# Patient Record
(demographics unavailable — no encounter records)

---

## 2025-06-19 NOTE — PROCEDURE
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 After decongestant and anesthesia were applied the laryngoscope was passed. The nasal cavities, nasopharynx, oropharynx, hypopharynx, and larynx were all examined. Vocal folds were examined during respiration and phonation. The following findings were noted:   Findings: Nose: Septum is midline, turbinates are normal, nasal airways patent, mucosa normal Nasopharynx: Adenoids normal, no masses, eustachian tube normal Oropharynx: Pharyngeal walls symmetric and without lesion. Tonsils/fossae symmetric Hypopharynx: Hypopharynx and pyriform sinuses without lesion. No masses or asymmetry. + pooling of secretions. Larynx: Epiglottis and aryepiglottic folds were sharp and crisp bilaterally. Bilateral false vocal folds normal appearance. Airway was widely patent. + postcricoid edema, erythema of the vocal process   Strobe Exam Ratings   TVF Appearance: normal, mild erythema of the vocal process, left TVF ectasia  TVF Mobility: normal Edema/hypertrophy: normal, + postcricoid edema Mucus on TVF: normal Glottic Closure: normal/adequate Mucosal Wave: normal Amplitude of Vibration: normal Phase: symmetric Supraglottic Hyperfunction: none Other Findings: none   Condition: Stable. Patient tolerated procedure well.   Complications: None  ---------------------    Laryngoscopy: PREOPERATIVE DIAGNOSIS: neurogenic cough POSTOPERATIVE DIAGNOSIS: Same as above NAME OF PROCEDURE: Superior Laryngeal Nerve Injection - 41477 - Right   SURGEON: Bruce Delcid MD   ASSISTANTS: none ANESTHESIA: none ESTIMATED BLOOD LOSS: < 1 cc SPECIMENS: None COMPLICATIONS: None   INDICATIONS FOR SURGERY: This is a patient with throat discomfort and chronic cough with presumed neurogenic cough. The decision was made to proceed with a superior laryngeal nerve injection. The patient understands the risks, benefits, and alternatives of the surgery including possibility of worsened voice, pain, discomfort, bleeding, need for further surgery, difficulty swallowing, and wished to go ahead with the operation as planned. I spoke with the patient and all of their questions were answered to their satisfaction and they asked me to perform this procedure.   DETAILS OF THE PROCEDURE: The patient was brought to the procedure room and I identified them. A formal timeout was called.   The neck was wiped clean with an alcohol wipe.  Next I palpated landmarks including the hyoid, and thryoid cartilage.  Once the thyrohyoid membrane was identified I palpated the carotid on the RIGHT side and protected this with my finger.  Next using a 25 gauge needle I injected 2cc (50:50 Kenalog 40 and Bupivicaine 0.5% 1cm anterior and 1cm deep to the carotid and into the thyroid membrane. The injector was then withdrawn.  No bleeding was identified.  The patient was observed for 15 minutes without complication.   Complications: none Disposition: home

## 2025-06-19 NOTE — ASSESSMENT
[FreeTextEntry1] : - 4/16/24: 70 y/o F presents with cough since 2016. She reports it is productive and she expectorates clear phlegm, it is every day, throughout the day. No issues eating, or chewing, but she has had choking episodes on solids/ liquids over the past 2 months. She feels her voice is raspy, worse over the past 6 months. Based on history and physical exam, I do believe there is a component of laryngopharyngeal reflux. At this time I am recommending dietary and behavioral change to reduce acid in the diet. I am also recommending she continue Pantoprazole and Famotidine for a 3 months trial and cough avoidance. If cough persists consider treatment for neurogenic cough.   Of note she has chronic rhinorrhea and postnasal drip. On physical exam/ nasal endoscopy she was found to have left septal deviation. I am recommending nasal saline rinses, nasal steroids, add Azelastine.  8/12/24 70% subjective improvement in terms of chronic cough. On physical exam/stroboscopy, improvement in yellow discoloration on vocal folds likely 2/2 cough related trauma. I am recommending she begin Ipratropium nasal spray for 3 week trial and during that time she stop Azelastine and otherwise continue rinses, nasal steroids, and reflux management. If no benefit with ipratropium, switch back to azelastine. Follow up in 2-3 months. If stable with cough improvement can consider reintroducing certain foods such as blueberries.  4/11/25: She has been following a low acid diet and on antireflux medications, using nasal saline rinses and nasal antihistamines and feels cough worsened over the past 5 months. Overall cough is 60% improved compared to symptom onset. On physical exam she was found to have evidence of LPR. She has been treated for LPR, asthma has been ruled out. I suspect she may be suffering from neurogenic cough. We discussed treatment with neuromodulators and superior laryngeal nerve injections. She has stress test later today. I recommended trial of Tramadol and to follow up in 2 weeks for superior laryngeal nerve injection. I also recommended consultation with SLP for cough suppression. Continue current reflux regimen.   5/7/25 No change in symptoms.  Tramadol was not effective.  Did left superior laryngeal nerve injection without issue.  Follow up in 2 weeks for repeat injection, next on the right side.  5/22/25: 2 weeks s/p left superior laryngeal nerve injection.  Notes an 85% improvement in terms of symptoms.  We had a lengthy discussion regarding proceeding with another injection versus observing for now.  Patient wanted to proceed with the right side and will follow-up in 2 weeks to review.  If stable or improved, we will likely recommend observation instead of proceeding with more injections.  6/5/25: Patient is 2 weeks s/p right superior laryngeal nerve injection (2/4). She notes 60% improvement in cough since initial onset of symptoms. She feels she took a step back as soon as she got to [place], [place] and feels humidity/ air quality worsened cough, came back 4 days ago and cough is slightly improved since being back.  Today we did a left-sided superior laryngeal nerve injection (3/4). Patient tolerated this well. They will follow-up in approximately 2 weeks for repeat evaluation.  6/19/25: Patient is 2 weeks s/p left superior laryngeal nerve injection today (3/4). She notes cough worsened approximately 4 days ago, but she still notes overall a 60% improvement since initial onset of symptoms. 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 Today we did a right-sided superior laryngeal nerve injection (4/4). Patient tolerated this well. I am recommending restarting anti reflux medications, Omeprazole and Famotidine. They will follow-up in approximately 2 weeks for repeat evaluation.  -right superior laryngeal nerve injection today (4/4) -Continue nasal saline rinses and nasal steroids and Azelastine  -Voice hygiene, increase hydration, sips of water throughout the day, avoid throat clearing, cough avoidance  -Consultation with SLP for cough suppression -low acid diet -Omeprazole and Famotidine  -Follow up in 2 weeks

## 2025-06-19 NOTE — HISTORY OF PRESENT ILLNESS
[de-identified] : 4/16/24: 70 y/o F presents with constant cough since 2016. She reports it is productive and she expectorates clear phlegm, it is every day, throughout the day. No issues eating, or chewing, but she has had choking episodes on solids/ liquids over the past 2 months. No issues breathing. She has seen Dr. Mane and tested negative for asthma. No regurgitation of food. She feels her voice is raspy, worse over the past 6 months. Works as a . No significant voice demands. She has HTN, not on any ACE inhibitors. Of note she is S/p excision of left submandibular gland for pleomorphic adenoma in July 2016 with Dr. Avila. She has history of reflux. She is on Pantoprazole and following a low acid diet, previously on Famotidine for at least one year with no improvement.  Diet: +tea, mint, garlic, onion, blueberry, carbonated beverages -coffee, soda, chocolate, tomato, citrus, garlic, spicy food water intake: 10 glasses of [employer] late night eating: no  Of note she has chronic rhinorrhea and postnasal drip. She denies facial pain or changes in smell or taste. She started using Flonase which she finds helpful, no rinses. Saw allergist 2 years ago. No pertinent positives per pt.  - 8/12/24 Patient has been following a low acid diet, added famotidine qPM to pantoprazole qAM, and daily saline sinus rinses, nasal steroids, azelastine and notes 70% improvement in terms of her cough. Voice improved with coughing less. She continues to cough in the mornings when she wakes up and in the afternoon at the office otherwise her cough is not present nearly as severely or frequently as it was before. She believes that it is related to her nose, triggered by the carpet in her office. Nasal drainage worse with eating cold foods. No issues eating, chewing, or swallowing. No breathing issues. No new ENT issues otherwise. - 4/11/25: Patient presents for repeat evaluation. She has been following a low acid diet and on antireflux medications, using nasal saline rinses and nasal antihistamines and feels cough worsened over the past 5 months. Overall cough is 60% improved compared to symptom onset. Last CXR was in 4/24 which was normal. She endorses multiple coughing fits throughout the day, longest she can go is 1 hour. She feels cough is triggered by certain scents such as aerosol sprays and perfumes. No issues eating, chewing, or swallowing. She feels her voice has been stable.  - 5/7/25 Continued pantoprazole/famotidine and initiated tramadol trial without improvement. Cough suppression therapy not yet scheduled.  Still with cough.  No new symptoms.  No new ENT issues otherwise. - 5/22/25: 2 weeks s/p left superior laryngeal nerve injection.  Notes an 85% improvement in terms of cough related symptoms.  She is very happy with these results.  Did not like taking the tramadol.  No new ENT issues otherwise. - 6/5/25: Patient is 2 weeks s/p right superior laryngeal nerve injection (2/4). She tolerated this well without ariza eeffects. She notes 60% improvement in cough since initial onset of symptoms. She feels she took a step back as soon as she got to [place], [place] and feels humidity/ air quality worsened cough, came back 4 days ago and cough is slightly improved since being back. Patient not currently on antireflux medications. No new ENT issues otherwise.  - [FreeTextEntry1] : 6/19/25: Patient is 2 weeks s/p left superior laryngeal nerve injection today (3/4). She tolerated this well without side effects. She notes cough worsened approximately 4 days ago, but she still notes overall a 60% improvement in cough since initial onset of symptoms.  No new ENT issues otherwise.

## 2025-06-30 NOTE — HISTORY OF PRESENT ILLNESS
[FreeTextEntry1] : 69 y/o F with PMH HTN and DM2 presenting for dizziness, nausea, and vomiting  since yesterday. She reports that yesterday at 6PM she had sudden onset pain  behind her right eye that lasted for minutes and then resolved. She then  started to have blurry vision in the right eye. Later in the evening she  started to experience vertigo like symptoms. When standing from a seated  position she started feeling as if the room was spinning followed by nausea and  vomiting. Motion in the taxi also caused the symptoms to occur. She woke up  this morning and continued to have the symptoms and believed that they were  worse. Denies chest pain, SOB, abdominal pain. No symptoms like this in the  past. Denies trauma. Recently HTN and diabetes medication doses were reduced in  office with Dr. Milner.   In the ED, stroke code was called - CTH was negative for any acute pathology -  however, given symptoms that did not fully convey a picture of simple vertigo,  patient to be admitted to the [facility] for a stroke workup. Her MRI was  negative for any acute pathology and an ECHO illustrated normal ejection  fraction and no valve disorders.   On tele, she had been bradycardic (40s on day of admission, 50s the next day)  with low BP - her beta blocker and blood pressure medications were held. There  is a chance that her blood pressure medication doses are too high and are  leading to symptoms.  Here for f/u. 4/29/19 Reports losing her job, under stress. 9/25/20 Hand lesion recurred on left, S/E from Metformin 11/24/20  Seeing Dr Aleman, Had CT and MRI of liver.  Cataract OD Dec 2, 2020 11/5/21 Getting HAs, saw Neuro for back and neck pain mos ago. HAs started 1 week ago.  Located across both brows.  + SCIATICA  D/T TRAIN RIDES TO [place] 8/12/22 In the [facility] ER yesterday with  systolic. 12 days earlier Dr Lai stopped Norvasc and was she given Losartan 50 mg. Imaging done because of HAs, possible carotid webs. 4/23/26 chronic cough, has seen Pulm and Allergy, it has been since the neck surgery.  Allergist thinks it is D/T nerve damage.  Bothered by diarrhea from Metformin, wants to stop it 6/30/25 here for follow up of blood pressures. taking it daily -ranging from bp 140s-160s. has noticed her pressure has been trending up. having severe headaches. lying down makes it better. feels shooting brain freeze like feeling in the back of her head. 10/10 pain. prior to her headaches, her bp was from 130s-140s daily. had leg cramps on triamterene-hctz.

## 2025-06-30 NOTE — REASON FOR VISIT
[Follow-Up - Clinic] : a clinic follow-up of [Hypertension] : hypertension [Medication Management] : Medication management [FreeTextEntry1] : HTN and DM ARE THE 2 MAIN ISSUES.   Losing weight with diet and exercise.\par  \par  colonoscopy 2/2018, 2019, 2020\par  Mammogram- 5/2018\par  \par  EKG: NSR with ST-[facility] abnormalities.9/26/18, 9/25/20\par  \par  Puljaylene- Dr Mane\par  GI- Dr Cavazos\par  Ophtho- Dr Shepherd\par  Hand

## 2025-06-30 NOTE — ASSESSMENT
[FreeTextEntry1] : Carotid Webs- seen B/L on CTA in , will refer to Dr Merritt for confirmation. 6/30/25 no evidence of fmd on renal u/s  HTN- Labile after stopping Norvasc 10 mg. Back on it with Dyazide.  Pt instructed on what to do with diuretic, must take home readings. Adding Bystolic 2.5 mg on 4/26/23. 6/30/25 consistently elevated. will add candesartan as it can help with HAs. rto in 2-3 weeks for bp recheck and labs   Hand/wrist lesion- referred to [facility] Hand service.  Recurrence of lesion  [id number] 5.9 2015, 5.8 2016.  Did see Ophtho, continues to lose weight voluntarily.  Changing Metformin to LA form to see if GI S/E are better . She wants off of it D/T diarrhea. Will refer to diabetes center.  ABNORMAL EKG- chronic and stable Tw abn 6/30/25 will get echo d/t uncontrolled htn and ekg abnormalities   HA- Massage, humidify bedroom, nasal spray.   Imaging negative in ER except for carotid webs. ESR drawn. Labs were drawn today in Office. 6/30/25 will get xray of cervical spine. encouraged hot packs and tylenol prn.   HLD- LDL 96 IN 2016,  labs excellent May 2018, and order a CCS since she is contemplating left foot surgery the end of October.  CCS = zero.   Starting statin for LDL > 70 in a prediabetic. 6/30/25 off of statin -will recheck lipids at follow up.   Anxiety- Alprazolam 0.5mg given, # 30, [id number], [id number]  Elevated Alk Phos- Seeing Dr Aleman.    frequent urination - 6/30/25 will get UA. A1C at follow up   Screening- Had colonoscopy with Dr HANSEN, 2/2019.  needs repeat colonoscopy due to mult polyps.  Done Sept 2020. Mammogram yearly, GYN Dr Farmer.

## 2025-06-30 NOTE — REVIEW OF SYSTEMS
[Headache] : headache [Negative] : Heme/Lymph Low Dose Naltrexone Pregnancy And Lactation Text: Naltrexone is pregnancy category C.  There have been no adequate and well-controlled studies in pregnant women.  It should be used in pregnancy only if the potential benefit justifies the potential risk to the fetus.   Limited data indicates that naltrexone is minimally excreted into breastmilk.

## 2025-07-16 NOTE — ASSESSMENT
[FreeTextEntry1] : - 4/16/24: 68 y/o F presents with cough since 2016. She reports it is productive and she expectorates clear phlegm, it is every day, throughout the day. No issues eating, or chewing, but she has had choking episodes on solids/ liquids over the past 2 months. She feels her voice is raspy, worse over the past 6 months. Based on history and physical exam, I do believe there is a component of laryngopharyngeal reflux. At this time I am recommending dietary and behavioral change to reduce acid in the diet. I am also recommending she continue Pantoprazole and Famotidine for a 3 months trial and cough avoidance. If cough persists consider treatment for neurogenic cough.   Of note she has chronic rhinorrhea and postnasal drip. On physical exam/ nasal endoscopy she was found to have left septal deviation. I am recommending nasal saline rinses, nasal steroids, add Azelastine.  8/12/24 70% subjective improvement in terms of chronic cough. On physical exam/stroboscopy, improvement in yellow discoloration on vocal folds likely 2/2 cough related trauma. I am recommending she begin Ipratropium nasal spray for 3 week trial and during that time she stop Azelastine and otherwise continue rinses, nasal steroids, and reflux management. If no benefit with ipratropium, switch back to azelastine. Follow up in 2-3 months. If stable with cough improvement can consider reintroducing certain foods such as blueberries.  4/11/25: She has been following a low acid diet and on antireflux medications, using nasal saline rinses and nasal antihistamines and feels cough worsened over the past 5 months. Overall cough is 60% improved compared to symptom onset. On physical exam she was found to have evidence of LPR. She has been treated for LPR, asthma has been ruled out. I suspect she may be suffering from neurogenic cough. We discussed treatment with neuromodulators and superior laryngeal nerve injections. She has stress test later today. I recommended trial of Tramadol and to follow up in 2 weeks for superior laryngeal nerve injection. I also recommended consultation with SLP for cough suppression. Continue current reflux regimen.   5/7/25 No change in symptoms.  Tramadol was not effective.  Did left superior laryngeal nerve injection without issue.  Follow up in 2 weeks for repeat injection, next on the right side.  5/22/25: 2 weeks s/p left superior laryngeal nerve injection.  Notes an 85% improvement in terms of symptoms.  We had a lengthy discussion regarding proceeding with another injection versus observing for now.  Patient wanted to proceed with the right side and will follow-up in 2 weeks to review.  If stable or improved, we will likely recommend observation instead of proceeding with more injections.  6/5/25: Patient is 2 weeks s/p right superior laryngeal nerve injection (2/4). She notes 60% improvement in cough since initial onset of symptoms. She feels she took a step back as soon as she got to [place], [place] and feels humidity/ air quality worsened cough, came back 4 days ago and cough is slightly improved since being back.  Today we did a left-sided superior laryngeal nerve injection (3/4). Patient tolerated this well. They will follow-up in approximately 2 weeks for repeat evaluation.  6/19/25: Patient is 2 weeks s/p left superior laryngeal nerve injection today (3/4). She notes cough worsened approximately 4 days ago, but she still notes overall a 60% improvement since initial onset of symptoms. 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 Today we did a right-sided superior laryngeal nerve injection (4/4). Patient tolerated this well. I am recommending restarting anti reflux medications, Omeprazole and Famotidine. They will follow-up in approximately 2 weeks for repeat evaluation.  -right superior laryngeal nerve injection today (4/4) -Continue nasal saline rinses and nasal steroids and Azelastine  -Voice hygiene, increase hydration, sips of water throughout the day, avoid throat clearing, cough avoidance  -Consultation with SLP for cough suppression -low acid diet -Omeprazole and Famotidine  -Follow up in 2 weeks

## 2025-07-16 NOTE — HISTORY OF PRESENT ILLNESS
[de-identified] : 4/16/24: 68 y/o F presents with constant cough since 2016. She reports it is productive and she expectorates clear phlegm, it is every day, throughout the day. No issues eating, or chewing, but she has had choking episodes on solids/ liquids over the past 2 months. No issues breathing. She has seen Dr. Mane and tested negative for asthma. No regurgitation of food. She feels her voice is raspy, worse over the past 6 months. Works as a . No significant voice demands. She has HTN, not on any ACE inhibitors. Of note she is S/p excision of left submandibular gland for pleomorphic adenoma in July 2016 with Dr. Avila. She has history of reflux. She is on Pantoprazole and following a low acid diet, previously on Famotidine for at least one year with no improvement.  Diet: +tea, mint, garlic, onion, blueberry, carbonated beverages -coffee, soda, chocolate, tomato, citrus, garlic, spicy food water intake: 10 glasses of [employer] late night eating: no  Of note she has chronic rhinorrhea and postnasal drip. She denies facial pain or changes in smell or taste. She started using Flonase which she finds helpful, no rinses. Saw allergist 2 years ago. No pertinent positives per pt.  - 8/12/24 Patient has been following a low acid diet, added famotidine qPM to pantoprazole qAM, and daily saline sinus rinses, nasal steroids, azelastine and notes 70% improvement in terms of her cough. Voice improved with coughing less. She continues to cough in the mornings when she wakes up and in the afternoon at the office otherwise her cough is not present nearly as severely or frequently as it was before. She believes that it is related to her nose, triggered by the carpet in her office. Nasal drainage worse with eating cold foods. No issues eating, chewing, or swallowing. No breathing issues. No new ENT issues otherwise. - 4/11/25: Patient presents for repeat evaluation. She has been following a low acid diet and on antireflux medications, using nasal saline rinses and nasal antihistamines and feels cough worsened over the past 5 months. Overall cough is 60% improved compared to symptom onset. Last CXR was in 4/24 which was normal. She endorses multiple coughing fits throughout the day, longest she can go is 1 hour. She feels cough is triggered by certain scents such as aerosol sprays and perfumes. No issues eating, chewing, or swallowing. She feels her voice has been stable.  - 5/7/25 Continued pantoprazole/famotidine and initiated tramadol trial without improvement. Cough suppression therapy not yet scheduled.  Still with cough.  No new symptoms.  No new ENT issues otherwise. - 5/22/25: 2 weeks s/p left superior laryngeal nerve injection.  Notes an 85% improvement in terms of cough related symptoms.  She is very happy with these results.  Did not like taking the tramadol.  No new ENT issues otherwise. - 6/5/25: Patient is 2 weeks s/p right superior laryngeal nerve injection (2/4). She tolerated this well without raiza eeffects. She notes 60% improvement in cough since initial onset of symptoms. She feels she took a step back as soon as she got to [place], [place] and feels humidity/ air quality worsened cough, came back 4 days ago and cough is slightly improved since being back. Patient not currently on antireflux medications. No new ENT issues otherwise.  - 6/19/25: Patient is 2 weeks s/p left superior laryngeal nerve injection today (3/4). She tolerated this well without side effects. She notes cough worsened approximately 4 days ago, but she still notes overall a 60% improvement in cough since initial onset of symptoms.  No new ENT issues otherwise.  - [FreeTextEntry1] : 7/14/25  Oriented - self; Oriented - place; Oriented - time

## 2025-07-16 NOTE — PROCEDURE
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 After decongestant and anesthesia were applied the laryngoscope was passed. The nasal cavities, nasopharynx, oropharynx, hypopharynx, and larynx were all examined. Vocal folds were examined during respiration and phonation. The following findings were noted:   Findings: Nose: Septum is midline, turbinates are normal, nasal airways patent, mucosa normal Nasopharynx: Adenoids normal, no masses, eustachian tube normal Oropharynx: Pharyngeal walls symmetric and without lesion. Tonsils/fossae symmetric Hypopharynx: Hypopharynx and pyriform sinuses without lesion. No masses or asymmetry. + pooling of secretions. Larynx: Epiglottis and aryepiglottic folds were sharp and crisp bilaterally. Bilateral false vocal folds normal appearance. Airway was widely patent. + postcricoid edema, erythema of the vocal process   Strobe Exam Ratings   TVF Appearance: normal, mild erythema of the vocal process, left TVF ectasia  TVF Mobility: normal Edema/hypertrophy: normal, + postcricoid edema Mucus on TVF: normal Glottic Closure: normal/adequate Mucosal Wave: normal Amplitude of Vibration: normal Phase: symmetric Supraglottic Hyperfunction: none Other Findings: none   Condition: Stable. Patient tolerated procedure well.   Complications: None  ---------------------    Laryngoscopy: PREOPERATIVE DIAGNOSIS: neurogenic cough POSTOPERATIVE DIAGNOSIS: Same as above NAME OF PROCEDURE: Superior Laryngeal Nerve Injection - 15477 - Right   SURGEON: Bruce Delcid MD   ASSISTANTS: none ANESTHESIA: none ESTIMATED BLOOD LOSS: < 1 cc SPECIMENS: None COMPLICATIONS: None   INDICATIONS FOR SURGERY: This is a patient with throat discomfort and chronic cough with presumed neurogenic cough. The decision was made to proceed with a superior laryngeal nerve injection. The patient understands the risks, benefits, and alternatives of the surgery including possibility of worsened voice, pain, discomfort, bleeding, need for further surgery, difficulty swallowing, and wished to go ahead with the operation as planned. I spoke with the patient and all of their questions were answered to their satisfaction and they asked me to perform this procedure.   DETAILS OF THE PROCEDURE: The patient was brought to the procedure room and I identified them. A formal timeout was called.   The neck was wiped clean with an alcohol wipe.  Next I palpated landmarks including the hyoid, and thryoid cartilage.  Once the thyrohyoid membrane was identified I palpated the carotid on the RIGHT side and protected this with my finger.  Next using a 25 gauge needle I injected 2cc (50:50 Kenalog 40 and Bupivicaine 0.5% 1cm anterior and 1cm deep to the carotid and into the thyroid membrane. The injector was then withdrawn.  No bleeding was identified.  The patient was observed for 15 minutes without complication.   Complications: none Disposition: home

## 2025-07-18 NOTE — HISTORY OF PRESENT ILLNESS
[FreeTextEntry1] : 67 y/o F with PMH HTN and DM2 presenting for dizziness, nausea, and vomiting  since yesterday. She reports that yesterday at 6PM she had sudden onset pain  behind her right eye that lasted for minutes and then resolved. She then  started to have blurry vision in the right eye. Later in the evening she  started to experience vertigo like symptoms. When standing from a seated  position she started feeling as if the room was spinning followed by nausea and  vomiting. Motion in the taxi also caused the symptoms to occur. She woke up  this morning and continued to have the symptoms and believed that they were  worse. Denies chest pain, SOB, abdominal pain. No symptoms like this in the  past. Denies trauma. Recently HTN and diabetes medication doses were reduced in  office with Dr. Milner.   In the ED, stroke code was called - CTH was negative for any acute pathology -  however, given symptoms that did not fully convey a picture of simple vertigo,  patient to be admitted to the [facility] for a stroke workup. Her MRI was  negative for any acute pathology and an ECHO illustrated normal ejection  fraction and no valve disorders.   On tele, she had been bradycardic (40s on day of admission, 50s the next day)  with low BP - her beta blocker and blood pressure medications were held. There  is a chance that her blood pressure medication doses are too high and are  leading to symptoms.  Here for f/u. 4/29/19 Reports losing her job, under stress. 9/25/20 Hand lesion recurred on left, S/E from Metformin 11/24/20  Seeing Dr Aleman, Had CT and MRI of liver.  Cataract OD Dec 2, 2020 11/5/21 Getting HAs, saw Neuro for back and neck pain mos ago. HAs started 1 week ago.  Located across both brows.  + SCIATICA  D/T TRAIN RIDES TO [place] 8/12/22 In the [facility] ER yesterday with  systolic. 12 days earlier Dr Lai stopped Norvasc and was she given Losartan 50 mg. Imaging done because of HAs, possible carotid webs. 4/23/26 chronic cough, has seen Pulm and Allergy, it has been since the neck surgery.  Allergist thinks it is D/T nerve damage.  Bothered by diarrhea from Metformin, wants to stop it 6/30/25 here for follow up of blood pressures. taking it daily -ranging from bp 140s-160s. has noticed her pressure has been trending up. having severe headaches. lying down makes it better. feels shooting brain freeze like feeling in the back of her head. 10/10 pain. prior to her headaches, her bp was from 130s-140s daily. had leg cramps on triamterene-hctz.  7/18/25 here for follow up of HAs. her headaches have not improved. waking up with HAs almost daily. she does have some relief with hot packs. she notes increased caregiver burden. bps have improved slightly but remain elevated in 130s-140s.

## 2025-07-18 NOTE — ASSESSMENT
[FreeTextEntry1] : Carotid Webs- seen B/L on CTA in , will refer to Dr Merritt for confirmation. 6/30/25 no evidence of fmd on renal u/s  HTN- Labile after stopping Norvasc 10 mg. Back on it with Dyazide.  Pt instructed on what to do with diuretic, must take home readings. Adding Bystolic 2.5 mg on 4/26/23. 6/30/25 consistently elevated. will add candesartan as it can help with HAs. rto in 2-3 weeks for bp recheck and labs. 7/17/25 labs today. will increase candesartan to 32mg.   Hand/wrist lesion- referred to [facility].  Recurrence of lesion  [id number] 5.9 2015, 5.8 2016.  Did see Ophtho, continues to lose weight voluntarily.  Changing Metformin to LA form to see if GI S/E are better . She wants off of it D/T diarrhea. Will refer to diabetes center.  ABNORMAL EKG- chronic and stable Tw abn 6/30/25 will get echo d/t uncontrolled htn and ekg abnormalities   HA- Massage, humidify bedroom, nasal spray.   Imaging negative in ER except for carotid webs. ESR drawn. Labs were drawn today in Office. 6/30/25 will get xray of cervical spine. encouraged hot packs and tylenol prn. 7/17/25 will get ct no contrast to assess. can consider ortho referral if ct negative.   HLD- LDL 96 IN 2016,  labs excellent May 2018, and order a CCS since she is contemplating left foot surgery the end of October.  CCS = zero.   Starting statin for LDL > 70 in a prediabetic. 6/30/25 off of statin -will recheck lipids at follow up.   Anxiety- Alprazolam 0.5mg given, # 30, [id number], [id number]  Elevated Alk Phos- Seeing Dr Aleman.    frequent urination - 6/30/25 will get UA. A1C at follow up   Screening- Had colonoscopy with Dr HANSEN, 2/2019.  needs repeat colonoscopy due to mult polyps.  Done Sept 2020. Mammogram yearly, GYN Dr Farmer.  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